# Patient Record
Sex: MALE | URBAN - METROPOLITAN AREA
[De-identification: names, ages, dates, MRNs, and addresses within clinical notes are randomized per-mention and may not be internally consistent; named-entity substitution may affect disease eponyms.]

---

## 2017-10-16 ENCOUNTER — OCC HEALTH (OUTPATIENT)
Dept: OCCUPATIONAL MEDICINE | Age: 47
End: 2017-10-16
Attending: PHYSICIAN ASSISTANT

## 2022-02-28 ENCOUNTER — HOSPITAL ENCOUNTER (OUTPATIENT)
Dept: RADIOLOGY | Facility: HOSPITAL | Age: 52
Discharge: HOME | End: 2022-02-28
Attending: NURSE PRACTITIONER
Payer: OTHER MISCELLANEOUS

## 2022-02-28 ENCOUNTER — OFFICE VISIT (OUTPATIENT)
Dept: PRIMARY CARE CLINIC | Facility: CLINIC | Age: 52
End: 2022-02-28
Payer: OTHER MISCELLANEOUS

## 2022-02-28 ENCOUNTER — LAB VISIT (OUTPATIENT)
Dept: PRIMARY CARE CLINIC | Facility: CLINIC | Age: 52
End: 2022-02-28

## 2022-02-28 VITALS
BODY MASS INDEX: 37.25 KG/M2 | SYSTOLIC BLOOD PRESSURE: 172 MMHG | WEIGHT: 275 LBS | DIASTOLIC BLOOD PRESSURE: 99 MMHG | OXYGEN SATURATION: 98 % | HEART RATE: 65 BPM | TEMPERATURE: 98 F | HEIGHT: 72 IN | RESPIRATION RATE: 20 BRPM

## 2022-02-28 DIAGNOSIS — S90.02XA CONTUSION OF LEFT ANKLE, INITIAL ENCOUNTER: ICD-10-CM

## 2022-02-28 DIAGNOSIS — S90.02XA CONTUSION OF LEFT ANKLE, INITIAL ENCOUNTER: Primary | ICD-10-CM

## 2022-02-28 DIAGNOSIS — Z02.83 ENCOUNTER FOR EMPLOYMENT-RELATED DRUG TESTING: ICD-10-CM

## 2022-02-28 DIAGNOSIS — S82.892A: ICD-10-CM

## 2022-02-28 PROCEDURE — 99000 PR SPECIMEN HANDLING,DR OFF->LAB: ICD-10-PCS | Mod: ,,, | Performed by: NURSE PRACTITIONER

## 2022-02-28 PROCEDURE — 73610 X-RAY EXAM OF ANKLE: CPT | Mod: TC,LT

## 2022-02-28 PROCEDURE — 99000 SPECIMEN HANDLING OFFICE-LAB: CPT | Mod: ,,, | Performed by: NURSE PRACTITIONER

## 2022-02-28 PROCEDURE — 73610 X-RAY EXAM OF ANKLE: CPT | Mod: 26,LT,, | Performed by: RADIOLOGY

## 2022-02-28 PROCEDURE — 99204 OFFICE O/P NEW MOD 45 MIN: CPT | Mod: ,,, | Performed by: NURSE PRACTITIONER

## 2022-02-28 PROCEDURE — 99204 PR OFFICE/OUTPT VISIT, NEW, LEVL IV, 45-59 MIN: ICD-10-PCS | Mod: ,,, | Performed by: NURSE PRACTITIONER

## 2022-02-28 PROCEDURE — 73610 XR ANKLE COMPLETE 3 VIEW LEFT: ICD-10-PCS | Mod: 26,LT,, | Performed by: RADIOLOGY

## 2022-02-28 RX ORDER — MELOXICAM 7.5 MG/1
7.5 TABLET ORAL DAILY
Qty: 10 TABLET | Refills: 0 | Status: SHIPPED | OUTPATIENT
Start: 2022-02-28

## 2022-02-28 NOTE — PROGRESS NOTES
Subjective:       Patient ID: Sharon Paul is a 51 y.o. male.    Chief Complaint: Work Related Injury (Presents today with work related injury that occurred today 02/28/22 to left ankle)    52 y/o bm presents with complaints of left ankle, lateral side injury. 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     Review of Systems   Musculoskeletal: Positive for gait problem, joint swelling and joint deformity.   All other systems reviewed and are negative.        Objective:      Physical Exam  Vitals and nursing note reviewed.   Constitutional:       Appearance: Normal appearance.   HENT:      Head: Normocephalic.   Eyes:      Pupils: Pupils are equal, round, and reactive to light.   Cardiovascular:      Rate and Rhythm: Normal rate and regular rhythm.      Heart sounds: Normal heart sounds.   Pulmonary:      Effort: Pulmonary effort is normal.      Breath sounds: Normal breath sounds.   Musculoskeletal:         General: Swelling, tenderness, deformity and signs of injury present. Normal range of motion.      Cervical back: Normal range of motion.      Comments: Left ankle, lateral side, with swelling and deformity.    Skin:     General: Skin is warm and dry.   Neurological:      General: No focal deficit present.      Mental Status: He is alert and oriented to person, place, and time.   Psychiatric:         Attention and Perception: Attention and perception normal.         Mood and Affect: Mood normal.         Speech: Speech normal.         Behavior: Behavior normal. Behavior is cooperative.         Cognition and Memory: Cognition normal.         Judgment: Judgment normal.       Imaging: X-Ray Ankle Complete 3 View Left    Result Date: 2/28/2022  EXAMINATION: XR ANKLE COMPLETE 3 VIEW LEFT CLINICAL HISTORY: Contusion of left ankle, initial encounter COMPARISON: None  TECHNIQUE: Frontal, lateral, and oblique views of the left ankle.   FINDINGS: Nondisplaced fracture through the inferior aspect of the lateral malleolus.  Overlying soft tissue swelling.     As above. Point of Service: [facility] Electronically signed by: Anup Coleman Date:    02/28/2022 Time:    10:46      Assessment:       Problem List Items Addressed This Visit        Orthopedic    Contusion of left ankle - Primary    Relevant Orders    X-Ray Ankle Complete 3 View Left (Completed)       Other    Fracture of left malleolus, closed, initial encounter          Plan:       Spoke with Dr. Mckeon, geoff placed and pt is to see Dr. Mckeon in am at 0930. Ice, elevate and Mobic as needed for pain. Out of work until seen by Dr. Mckeon.

## 2022-02-28 NOTE — PROGRESS NOTES
Subjective:       Patient ID: Sharon Paul is a 51 y.o. male.    Chief Complaint: No chief complaint on file.    HPI  Review of Systems      Objective:      Physical Exam    Assessment:       Problem List Items Addressed This Visit    None     Visit Diagnoses     Encounter for employment-related drug testing              Plan:         Drug testing only

## 2022-03-08 ENCOUNTER — HOSPITAL ENCOUNTER (OUTPATIENT)
Dept: RADIOLOGY | Facility: HOSPITAL | Age: 52
Discharge: HOME | End: 2022-03-08
Attending: ORTHOPAEDIC SURGERY
Payer: OTHER MISCELLANEOUS

## 2022-03-08 DIAGNOSIS — M79.672 LEFT FOOT PAIN: ICD-10-CM

## 2022-03-08 DIAGNOSIS — Z47.89 ORTHOPEDIC AFTERCARE: ICD-10-CM

## 2022-03-08 DIAGNOSIS — M25.562 ACUTE PAIN OF LEFT KNEE: ICD-10-CM

## 2022-03-08 PROCEDURE — 73562 X-RAY EXAM OF KNEE 3: CPT | Mod: TC,LT

## 2022-03-08 PROCEDURE — 73610 X-RAY EXAM OF ANKLE: CPT | Mod: TC,LT

## 2022-03-08 PROCEDURE — 73630 X-RAY EXAM OF FOOT: CPT | Mod: TC,LT

## 2022-03-29 ENCOUNTER — HOSPITAL ENCOUNTER (OUTPATIENT)
Dept: RADIOLOGY | Facility: HOSPITAL | Age: 52
Discharge: HOME | End: 2022-03-29
Attending: ORTHOPAEDIC SURGERY
Payer: OTHER MISCELLANEOUS

## 2022-03-29 DIAGNOSIS — Z47.89 ORTHOPEDIC AFTERCARE: ICD-10-CM

## 2022-03-29 PROCEDURE — 73610 X-RAY EXAM OF ANKLE: CPT | Mod: TC,LT

## 2022-04-19 ENCOUNTER — HOSPITAL ENCOUNTER (OUTPATIENT)
Dept: RADIOLOGY | Facility: HOSPITAL | Age: 52
Discharge: HOME | End: 2022-04-19
Attending: ORTHOPAEDIC SURGERY
Payer: OTHER MISCELLANEOUS

## 2022-04-19 DIAGNOSIS — Z47.89 ORTHOPEDIC AFTERCARE: ICD-10-CM

## 2022-04-19 PROCEDURE — 73610 X-RAY EXAM OF ANKLE: CPT | Mod: TC,LT

## 2022-05-17 ENCOUNTER — HOSPITAL ENCOUNTER (OUTPATIENT)
Dept: RADIOLOGY | Facility: HOSPITAL | Age: 52
Discharge: HOME | End: 2022-05-17
Attending: ORTHOPAEDIC SURGERY
Payer: OTHER MISCELLANEOUS

## 2022-05-17 DIAGNOSIS — Z09 FOLLOW-UP EXAMINATION, FOLLOWING OTHER SURGERY: ICD-10-CM

## 2022-05-17 DIAGNOSIS — Z47.89 ORTHOPEDIC AFTERCARE: ICD-10-CM

## 2022-05-17 PROCEDURE — 73610 X-RAY EXAM OF ANKLE: CPT | Mod: TC,LT

## 2022-05-17 PROCEDURE — 93971 US LOWER EXTREMITY VEINS LEFT: ICD-10-PCS | Mod: 26,LT,,

## 2022-05-17 PROCEDURE — 93971 EXTREMITY STUDY: CPT | Mod: TC,LT

## 2022-05-17 PROCEDURE — 93971 EXTREMITY STUDY: CPT | Mod: 26,LT,,

## 2022-05-19 DIAGNOSIS — M25.572 LEFT ANKLE PAIN: Primary | ICD-10-CM

## 2022-05-23 ENCOUNTER — CLINICAL SUPPORT (OUTPATIENT)
Dept: REHABILITATION | Facility: HOSPITAL | Age: 52
End: 2022-05-23
Attending: ORTHOPAEDIC SURGERY
Payer: OTHER MISCELLANEOUS

## 2022-05-23 DIAGNOSIS — R29.898 WEAKNESS OF LEFT LEG: ICD-10-CM

## 2022-05-23 DIAGNOSIS — M25.572 PAIN AND SWELLING OF LEFT ANKLE: ICD-10-CM

## 2022-05-23 DIAGNOSIS — S82.832D OTHER CLOSED FRACTURE OF DISTAL END OF LEFT FIBULA WITH ROUTINE HEALING, SUBSEQUENT ENCOUNTER: Primary | ICD-10-CM

## 2022-05-23 DIAGNOSIS — M25.572 LEFT ANKLE PAIN: ICD-10-CM

## 2022-05-23 DIAGNOSIS — M25.672 DECREASED RANGE OF MOTION OF LEFT ANKLE: ICD-10-CM

## 2022-05-23 DIAGNOSIS — M25.472 PAIN AND SWELLING OF LEFT ANKLE: ICD-10-CM

## 2022-05-23 PROCEDURE — 97161 PT EVAL LOW COMPLEX 20 MIN: CPT

## 2022-05-23 PROCEDURE — 97110 THERAPEUTIC EXERCISES: CPT

## 2022-05-23 NOTE — PROGRESS NOTES
See Plan of Care     Sup Visit performed today with DUDLEY Corrales and DUDLEY Amos.  All goals and treatment plan reviewed. Will work toward completion of all goals set.    First Treatment May Include :     Exercises   NuStep  SB    Ankle 4 way - Active Range of Motion   Dorsiflexion   Plantarflexion   Inversion   Eversion   Circles       Achilles Stretch on Stairs - 4 x 15 seconds  Heel Raises on Stairs - 3 x 10     Manual Therapy   Passive Range of Motion   Ankle Mobs     Modalities   E Stim or Ultrasound to address pain and swelling

## 2022-05-31 ENCOUNTER — CLINICAL SUPPORT (OUTPATIENT)
Dept: REHABILITATION | Facility: HOSPITAL | Age: 52
End: 2022-05-31
Payer: OTHER MISCELLANEOUS

## 2022-05-31 DIAGNOSIS — M25.572 PAIN AND SWELLING OF LEFT ANKLE: Primary | ICD-10-CM

## 2022-05-31 DIAGNOSIS — R29.898 WEAKNESS OF LEFT LEG: ICD-10-CM

## 2022-05-31 DIAGNOSIS — M25.472 PAIN AND SWELLING OF LEFT ANKLE: Primary | ICD-10-CM

## 2022-05-31 DIAGNOSIS — M25.672 DECREASED RANGE OF MOTION OF LEFT ANKLE: ICD-10-CM

## 2022-05-31 PROCEDURE — 97140 MANUAL THERAPY 1/> REGIONS: CPT | Mod: CQ

## 2022-05-31 PROCEDURE — 97110 THERAPEUTIC EXERCISES: CPT | Mod: CQ

## 2022-05-31 NOTE — PROGRESS NOTES
[facility]   Physical Therapy Treatment Note     Name: Sharon Paul  Clinic Number: [id number]  Physician: Kurtis Mckeon III, MD  Visit Date: 5/31/2022  Therapy Diagnosis:  Left Distal Fibular Fracture   Physician: Kurtis Mckeon III, MD     Physician Orders: PT Eval and Treat   Medical Diagnosis from Referral: Left Distal Fibular Fracture   Evaluation Date: 5/23/2022  Updated Plan of Care Due : 6/22/2022  Authorization Period Expiration: 5/19/2023  Plan of Care Expiration: 7/22/2022  Visit # / Visits authorized: 2 / 17    PTA Visit #: 1/5     Time In: 09:15 am  Time Out: 09:58 am  Total Billable Time: 43 minutes    SUBJECTIVE     Pt reports: he has been doing his stretches given to him at first visit and his pinky toe is numb and is foot is swollen, he is trying to prop it up and ice it as much as he can.  He was compliant with home exercise program.  Response to previous treatment: first visit since evaluation   Functional change: n/a    Pain: 5/10  Location: left ankle    OBJECTIVE     Objective Measures updated at progress report unless specified.     Treatment     Sharon received the treatments listed below:      therapeutic exercises to develop strength, endurance, ROM and flexibility for 30 minutes including:  Bike 5 minutes   SB stretch 4 x 20 second hold   prostretch in standing LLE 5 x 10 second hold   Achilles Stretch on Stairs - 4 x 15 seconds    Ankle 4 way - Active Range of Motion over wobble board   Dorsiflexion / Plantarflexion 20x   Inversion / Eversion 20x   Circles - CW and CCW 20x ea    Heel Raises on Stairs - 3 x 10       manual therapy techniques: Myofacial release, Manual Lymphatic Drainage and Soft tissue Mobilization were applied to the: left lower extremity  for 10 minutes, including:   Manual Therapy   Passive Range of Motion   Ankle Mobs   Myofascial release with green theraroller      Patient Education and Home Exercises     Home Exercises Provided and Patient  Education Provided     Education provided:   - importance of applying ice at home and elevating lower extremity to further decrease p! And edema.    Written Home Exercises Provided: Patient instructed to cont prior HEP. Exercises were reviewed and Sharon was able to demonstrate them prior to the end of the session.  Sharon demonstrated good  understanding of the education provided. See EMR under Patient Instructions for exercises provided during therapy sessions    ASSESSMENT   Supervisory visit with GELY ROSA, PT, DPT  prior to initial PTA visit.    Pt tolerated initial tx since evaluation well with pain experienced during stretches, but expressed relief as stretches continued. Therapist initiated Plan of Care with focus of improving range of motion and decreasing p!. Pt has significant edema present on left ankle at bilateral malleoli. He is showing improvements with range of motion and is able to actively move his ankle more in all 4 planes. Will continue to progress pt as tolerated within Plan of Care when he returns.       PMH:   Sharon is a 51 y.o. male referred to outpatient Physical Therapy with a medical diagnosis of Left Distal Fibula Fracture. Patient presents with increased pain and swelling in the Left Ankle. Edema is significant in the lower leg and especially around bilateral malleoli. He has pain with movement in all directions. He has significantly decreased Range of Motion and strength in the Left Ankle. Fibula appears to be healing well, but pain and mobility are still a problem. It appears patient may have some soft tissue damage. He may benefit from an MRI in the future if the pain and dysfunction are not able to be reduced. Patient has difficulty with ambulation. Patient ambulates with step-to Gait pattern. He has no dorsiflexion or plantarflexion during ambulation, rather he keeps the ankle in neutral at all times. Patient has decreased stance time and antalgic gait on the Left. Patient will  require Physical Therapy intervention to address all deficits and help patient to return to his max level of function.      Sharon Is progressing towards his goals.   Pt prognosis is Good.     Pt will continue to benefit from skilled outpatient physical therapy to address the deficits listed in the problem list box on initial evaluation, provide pt/family education and to maximize pt's level of independence in the home and community environment.     Pt's spiritual, cultural and educational needs considered and pt agreeable to plan of care and goals.     Anticipated Barriers for therapy: Possible Soft Tissue Damage     Goals:  Short Term Goals: 4 weeks   1. Independent with Home Exercise Program   2. Increase Left Ankle Active Range of Motion to 15 Degrees for Dorsiflexion and 40 Degrees for Plantarflexion  3. Increase Left Ankle Active Range of Motion to 30 Degrees for Inversion and 20 Degrees for Eversion  4. Increase Left Ankle Strength to grossly 3+/5  5. Patient will ambulate 500 feet with complaints of pain Less than or equal to 4/10     Long Term Goals: 8 weeks   1. Patient will increase Left Ankle Strength to grossly 4+/5  2. Patient will increase Left Ankle Active Range of Motion to Within Normal Limits all directions   3. Patient will ambulate 1000+ feet with complaints of pain in Left Ankle at Less than or equal to 3/10    PLAN     Plan of care Certification: 5/23/2022 to 7/22/2022.     Outpatient Physical Therapy 2 times weekly for 8 weeks to include the following interventions: Electrical Stimulation to decrease pain and inflammation, Gait Training, Manual Therapy, Moist Heat/ Ice, Neuromuscular Re-ed, Patient Education, Therapeutic Exercise and Ultrasound.       Bryan Mccormick, PTA

## 2022-06-08 ENCOUNTER — CLINICAL SUPPORT (OUTPATIENT)
Dept: REHABILITATION | Facility: HOSPITAL | Age: 52
End: 2022-06-08
Payer: OTHER MISCELLANEOUS

## 2022-06-08 DIAGNOSIS — M25.472 PAIN AND SWELLING OF LEFT ANKLE: Primary | ICD-10-CM

## 2022-06-08 DIAGNOSIS — M25.672 DECREASED RANGE OF MOTION OF LEFT ANKLE: ICD-10-CM

## 2022-06-08 DIAGNOSIS — R29.898 WEAKNESS OF LEFT LEG: ICD-10-CM

## 2022-06-08 DIAGNOSIS — M25.572 PAIN AND SWELLING OF LEFT ANKLE: Primary | ICD-10-CM

## 2022-06-08 PROCEDURE — 97140 MANUAL THERAPY 1/> REGIONS: CPT | Mod: CQ

## 2022-06-08 PROCEDURE — 97110 THERAPEUTIC EXERCISES: CPT | Mod: CQ

## 2022-06-08 PROCEDURE — 97016 VASOPNEUMATIC DEVICE THERAPY: CPT | Mod: CQ

## 2022-06-08 NOTE — PROGRESS NOTES
[facility]   Physical Therapy Treatment Note     Name: Sharon Paul  Clinic Number: [id number]  Physician: Kurtis Mckeon III, MD  Visit Date: 6/8/2022  Therapy Diagnosis:  Left Distal Fibular Fracture   Physician: Kurtis Mckeon III, MD     Physician Orders: PT Eval and Treat   Medical Diagnosis from Referral: Left Distal Fibular Fracture   Evaluation Date: 5/23/2022  Updated Plan of Care Due : 6/22/2022  Authorization Period Expiration: 5/19/2023  Plan of Care Expiration: 7/22/2022  Visit # / Visits authorized: 3 / 17    PTA Visit #: 2/5     Time In: 09:15 am  Time Out: 10:10 am  Total Billable Time: 55 minutes    SUBJECTIVE     Pt reports: he has been doing his stretches that were added to his home exercise program last visit and he reports they worked and he was a little sore but his swelling has gone down a good bit to where he was able to put his gym shoes on today. he is trying to prop it up and ice it as much as he can.  He was compliant with home exercise program.  Response to previous treatment: sore but relieved with decreased swelling  Functional change: n/a    Pain: 3/10  Location: left ankle    OBJECTIVE     Objective Measures updated at progress report unless specified.     Treatment     Sharon received the treatments listed below:      therapeutic exercises to develop strength, endurance, ROM and flexibility for 30 minutes including:  Bike 5 minutes   SB stretch 4 x 20 second hold   prostretch in standing LLE 5 x 15 second hold   Achilles Stretch on Stairs - 4 x 15 seconds  Heel Raises on Stairs - 2 x 10   Step ups LLE 2in step 20x     Ankle 4 way - Active Range of Motion over blue wobble board   Dorsiflexion / Plantarflexion 20x   Inversion / Eversion 20x   Circles - CW and CCW 20x ea    Resisted 4 way ankle yellow tband 20x ea LLE     manual therapy techniques: Myofacial release, Manual Lymphatic Drainage and Soft tissue Mobilization were applied to the: left lower extremity   for 10 minutes, including:   Manual Therapy   Passive Range of Motion   Ankle Mobs   Myofascial release with green theraroller    supervised modalities after being cleared for contradictions: Vasopneumatic GameReady with compression and LLE elevated over bolster for 10 minutes and  36 degrees    Patient Education and Home Exercises     Home Exercises Provided and Patient Education Provided     Education provided:   - importance of applying ice at home and elevating lower extremity to further decrease p! And edema.    Written Home Exercises Provided: Patient instructed to cont prior HEP. Exercises were reviewed and Sharon was able to demonstrate them prior to the end of the session.  Sharon demonstrated good  understanding of the education provided. See EMR under Patient Instructions for exercises provided during therapy sessions    ASSESSMENT     Pt arrived able to wear both lace up tennis shoes today, prior to last visit he was unable to wear these due to the significant edema present in LLE. He expressed the ankle exercises that were added to his home exercise program last time helped a lot and he has been doing them since last visit at home. Therapist progressed pt to resisted 4 way ankle with yellow band today and pt demo good carryover with this and only fatigue present at end. ROM was also assessed and updated today in the chart below. He is progressing well and we will continue to address edema in LLE when he returns and then progress resistive exercises at his tolerance while keeping edema and p! Under control. Ended tx with Vasopneumatic compression today with LLE elevated over bolster in supine. Pt expressed this helped relieve his pain.    Left Lower Extremity at evaluation (5/23/2022) ROM  (normal) Left lower extremity 6/8/2022       AROM PROM   AROM PROM                        5   ANKLE  Dorsiflexion  (20)        5 10   10     Plantarflexion  (50) 25 30   5      Inversion      (35)     18 22   5       Eversion        (97) 10 18                                                   PMH:   Sharon is a 51 y.o. male referred to outpatient Physical Therapy with a medical diagnosis of Left Distal Fibula Fracture. Patient presents with increased pain and swelling in the Left Ankle. Edema is significant in the lower leg and especially around bilateral malleoli. He has pain with movement in all directions. He has significantly decreased Range of Motion and strength in the Left Ankle. Fibula appears to be healing well, but pain and mobility are still a problem. It appears patient may have some soft tissue damage. He may benefit from an MRI in the future if the pain and dysfunction are not able to be reduced. Patient has difficulty with ambulation. Patient ambulates with step-to Gait pattern. He has no dorsiflexion or plantarflexion during ambulation, rather he keeps the ankle in neutral at all times. Patient has decreased stance time and antalgic gait on the Left. Patient will require Physical Therapy intervention to address all deficits and help patient to return to his max level of function.      Sharon Is progressing towards his goals.   Pt prognosis is Good.     Pt will continue to benefit from skilled outpatient physical therapy to address the deficits listed in the problem list box on initial evaluation, provide pt/family education and to maximize pt's level of independence in the home and community environment.     Pt's spiritual, cultural and educational needs considered and pt agreeable to plan of care and goals.     Anticipated Barriers for therapy: Possible Soft Tissue Damage     Goals:  Short Term Goals: 4 weeks   1. Independent with Home Exercise Program   2. Increase Left Ankle Active Range of Motion to 15 Degrees for Dorsiflexion and 40 Degrees for Plantarflexion  3. Increase Left Ankle Active Range of Motion to 30 Degrees for Inversion and 20 Degrees for Eversion  4. Increase Left Ankle Strength to grossly 3+/5  5.  Patient will ambulate 500 feet with complaints of pain Less than or equal to 4/10     Long Term Goals: 8 weeks   1. Patient will increase Left Ankle Strength to grossly 4+/5  2. Patient will increase Left Ankle Active Range of Motion to Within Normal Limits all directions   3. Patient will ambulate 1000+ feet with complaints of pain in Left Ankle at Less than or equal to 3/10    PLAN     Plan of care Certification: 5/23/2022 to 7/22/2022.     Outpatient Physical Therapy 2 times weekly for 8 weeks to include the following interventions: Electrical Stimulation to decrease pain and inflammation, Gait Training, Manual Therapy, Moist Heat/ Ice, Neuromuscular Re-ed, Patient Education, Therapeutic Exercise and Ultrasound.       Bryan Mccormick, PTA      6/8/2022

## 2022-06-13 NOTE — PLAN OF CARE
[facility]   Physical Therapy Initial Evaluation    Date: 5/23/2022   Name: Sharon Paul   Clinic Number: [id number]    Therapy Diagnosis:  Left Distal Fibular Fracture   Physician: Kurtis Mckeon III, MD    Physician Orders: PT Eval and Treat   Medical Diagnosis from Referral: Left Distal Fibular Fracture   Evaluation Date: 5/23/2022  Updated Plan of Care Due : 6/22/2022  Authorization Period Expiration: 5/19/2023  Plan of Care Expiration: 7/22/2022  Visit # / Visits authorized: 1/ 1 (Awaiting Worker's Comp Approval)    Time In: 5:00 pm   Time Out: 5:55 pm   Total Appointment Time (timed & untimed codes): 55 minutes    Precautions: Standard    Subjective   Date of onset: 2/28/22  History of current condition - Sharon reports: He was at work on 2/28/2022 and 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 Patient was found to have a nondisplaced fracture of the tip of the fibula good alignment.    Patient saw Ortho MD for a follow up on 5/17/2022. MD Note States in part :      Follow-up       LT ANKLE FX 3/18 (8 WEEKS)         Previos History :     Previous Info:   HISTORY:   Sharon Paul  is a 51 y.o. doing a lot better after got a roll about a lot better mood his knee is calmed down it is not bother ankle is feeling better now he has got his weight off of it still has the numbness on the lateral side of his foot however        IMPRESSION: Follow-up examination, following other surgery      Distal fibular fracture treated in a cam walker boot and a roll about seated job at work still has some numbness distally on the lateral side of the foot but discussed with him the of this by gets a stretch injury but the skin was not cuts there is no laceration to repair  Ass/Plan:  1. Ultrasound rule out DVT today   2.  will send to therapy for weight-bearing as tolerated work on range  "motion of the ankle once we rule out DVT  Ultrasound was negative for DVTs working put him on a coated aspirin 1 a day and get him in therapy check on him in 4 weeks        Medical History:   No past medical history on file.    Surgical History:   Sharon Paul  has a past surgical history that includes Appendectomy.    Medications:   Sharon has a current medication list which includes the following prescription(s): meloxicam.    Allergies:   Review of patient's allergies indicates:  No Known Allergies     Imaging : X-rays three views left ankle show the ankle mortise reduced there is a nondisplaced fracture of the tip of the fibula good alignment.      Prior Therapy: None for this   Social History:  lives with their spouse  Occupation: [employer]    Prior Level of Function: No problems with the leg prior to the accident  Current Level of Function: Swelling and pain in the Left Ankle    Pain:  Current 6/10, worst 10/10, best 4/10   Location: Left Ankle and Left Knee on occasion   Description: Aching, Throbbing, Numb and Sharp  Aggravating Factors: weight bearing activities   Easing Factors: relaxation, pain medication, ice, rest and elevation    Patients goals: "I want my life back. I need my leg to get better."    Objective       ANKLE OBSERVATION  Pronation: Bilateral     Girth malleoli: 33.5 cm left, 28 cm right         Right Lower Extremity  ROM/Strength Left lower Extremity     AROM PROM STRENGTH  AROM PROM STRENGTH   Normal    5/5 KNEE     Flexion  (140)  Normal   5/5                     Extension (0)                         20   5/5 ANKLE  Dorsiflexion     (20)        5 10  2/5   50                  Plantarflexion  (50) 10 15  2/5   35                  Inversion         (35)     5 10  1/5   30                   Eversion          (25) 5 8  1/5                                              ANKLE SPECIAL TESTS  Ankle was too painful for any special tests today so these were deferred     C. " Ankle  1. Anterior drawer test: Deferred   2. Inversion stress test: Deferred   3. Eversion stress test: Deferred     Weight Bearing:  [x] WEIGHT BEARING AS TOLERATED  [] PARTIAL WEIGHT BEARING  %  [] TOUCH TOE WEIGHT BEARING  [] NONWEIGHT BEARING     Ambulation:  Patient ambulates with step-to Gait pattern. He has no dorsiflexion or plantarflexion during ambulation, rather he keeps the ankle in neutral at all times. Patient has decreased stance time and antalgic gait on the Left.       KNEE OBSERVATION  Knee was Within Normal Limits today, but he does have some Left knee pain with increased weight bearing      Limitation/Restriction for FOTO Intake Ankle Survey    Therapist reviewed FOTO scores for Sharon Paul on 5/23/2022.   FOTO documents entered into [employer] - see Media section.    Limitation Score: 92%         TREATMENT   Treatment Time In: 5:40 pm   Treatment Time Out: 5:50 pm   Total Treatment time (time-based codes) separate from Evaluation: 10 minutes    Sharon received the treatments listed below:  THERAPEUTIC EXERCISES to develop ROM and flexibility for 10 minutes including :   Initiated the Home strengthening and stretching Program       Home Exercises and Patient Education Provided    Education provided:   - Discussed the findings from the Evaluation, Reviewed the Plan of Care, and Instructed patient on their Home Exercise Program     Written Home Exercises Provided: yes.  Exercises were reviewed and Sharon was able to demonstrate them prior to the end of the session.  Sharon demonstrated good  understanding of the education provided.     See EMR under Patient Instructions for exercises provided 5/23/2022.    Assessment   Sharon is a 51 y.o. male referred to outpatient Physical Therapy with a medical diagnosis of Left Distal Fibula Fracture. Patient presents with increased pain and swelling in the Left Ankle. Edema is significant in the lower leg and especially around bilateral malleoli. He has pain with movement  in all directions. He has significantly decreased Range of Motion and strength in the Left Ankle. Fibula appears to be healing well, but pain and mobility are still a problem. It appears patient may have some soft tissue damage. He may benefit from an MRI in the future if the pain and dysfunction are not able to be reduced. Patient has difficulty with ambulation. Patient ambulates with step-to Gait pattern. He has no dorsiflexion or plantarflexion during ambulation, rather he keeps the ankle in neutral at all times. Patient has decreased stance time and antalgic gait on the Left. Patient will require Physical Therapy intervention to address all deficits and help patient to return to his max level of function.       Patient prognosis is Good.   Patientt will benefit from skilled outpatient Physical Therapy to address the deficits stated above and in the chart below, provide patient /family education, and to maximize patientt's level of independence.     Plan of care discussed with patient: Yes  Patient's spiritual, cultural and educational needs considered and patient is agreeable to the plan of care and goals as stated below:     Anticipated Barriers for therapy: Possible Soft Tissue Damage    Goals:  Short Term Goals: 4 weeks   1. Independent with Home Exercise Program   2. Increase Left Ankle Active Range of Motion to 15 Degrees for Dorsiflexion and 40 Degrees for Plantarflexion  3. Increase Left Ankle Active Range of Motion to 30 Degrees for Inversion and 20 Degrees for Eversion  4. Increase Left Ankle Strength to grossly 3+/5  5. Patient will ambulate 500 feet with complaints of pain Less than or equal to 4/10    Long Term Goals: 8 weeks   1. Patient will increase Left Ankle Strength to grossly 4+/5  2. Patient will increase Left Ankle Active Range of Motion to Within Normal Limits all directions   3. Patient will ambulate 1000+ feet with complaints of pain in Left Ankle at Less than or equal to 3/10      Plan    Plan of care Certification: 5/23/2022 to 7/22/2022.    Outpatient Physical Therapy 2 times weekly for 8 weeks to include the following interventions: Electrical Stimulation to decrease pain and inflammation, Gait Training, Manual Therapy, Moist Heat/ Ice, Neuromuscular Re-ed, Patient Education, Therapeutic Exercise and Ultrasound.     GELY ROSA, PT, DPT    called by dr watters at triage

## 2022-06-16 ENCOUNTER — HOSPITAL ENCOUNTER (OUTPATIENT)
Dept: RADIOLOGY | Facility: HOSPITAL | Age: 52
Discharge: HOME | End: 2022-06-16
Attending: ORTHOPAEDIC SURGERY
Payer: OTHER MISCELLANEOUS

## 2022-06-16 DIAGNOSIS — Z47.89 ORTHOPEDIC AFTERCARE: ICD-10-CM

## 2022-06-16 PROCEDURE — 73610 X-RAY EXAM OF ANKLE: CPT | Mod: TC,LT

## 2022-07-05 ENCOUNTER — DOCUMENTATION ONLY (OUTPATIENT)
Dept: REHABILITATION | Facility: HOSPITAL | Age: 52
End: 2022-07-05
Payer: OTHER MISCELLANEOUS

## 2022-07-05 PROBLEM — R29.898 WEAKNESS OF LEFT LEG: Status: RESOLVED | Noted: 2022-05-23 | Resolved: 2022-07-05

## 2022-07-05 PROBLEM — M25.672 DECREASED RANGE OF MOTION OF LEFT ANKLE: Status: RESOLVED | Noted: 2022-05-23 | Resolved: 2022-07-05

## 2022-07-05 PROBLEM — M25.472 PAIN AND SWELLING OF LEFT ANKLE: Status: RESOLVED | Noted: 2022-05-23 | Resolved: 2022-07-05

## 2022-07-05 PROBLEM — S82.832D CLOSED FRACTURE OF DISTAL END OF LEFT FIBULA WITH ROUTINE HEALING: Status: RESOLVED | Noted: 2022-05-23 | Resolved: 2022-07-05

## 2022-07-05 PROBLEM — M25.572 PAIN AND SWELLING OF LEFT ANKLE: Status: RESOLVED | Noted: 2022-05-23 | Resolved: 2022-07-05

## 2024-06-25 ENCOUNTER — EKG ENCOUNTER (OUTPATIENT)
Dept: LAB | Age: 54
End: 2024-06-25
Attending: FAMILY MEDICINE

## 2024-06-25 ENCOUNTER — LAB ENCOUNTER (OUTPATIENT)
Dept: LAB | Facility: REFERENCE LAB | Age: 54
End: 2024-06-25
Attending: FAMILY MEDICINE

## 2024-06-25 DIAGNOSIS — Z01.810 PRE-OPERATIVE CARDIOVASCULAR EXAMINATION: Primary | ICD-10-CM

## 2024-06-25 DIAGNOSIS — E11.9 TYPE II DIABETES MELLITUS (HCC): ICD-10-CM

## 2024-06-25 LAB
ALBUMIN SERPL-MCNC: 4.5 G/DL (ref 3.2–4.8)
ALBUMIN/GLOB SERPL: 1.3 {RATIO} (ref 1–2)
ALP LIVER SERPL-CCNC: 63 U/L
ALT SERPL-CCNC: 32 U/L
ANION GAP SERPL CALC-SCNC: 3 MMOL/L (ref 0–18)
AST SERPL-CCNC: 24 U/L (ref ?–34)
BASOPHILS # BLD AUTO: 0.03 X10(3) UL (ref 0–0.2)
BASOPHILS NFR BLD AUTO: 0.8 %
BILIRUB SERPL-MCNC: 0.7 MG/DL (ref 0.3–1.2)
BUN BLD-MCNC: 12 MG/DL (ref 9–23)
BUN/CREAT SERPL: 12.2 (ref 10–20)
CALCIUM BLD-MCNC: 9.9 MG/DL (ref 8.7–10.4)
CHLORIDE SERPL-SCNC: 108 MMOL/L (ref 98–112)
CO2 SERPL-SCNC: 28 MMOL/L (ref 21–32)
CREAT BLD-MCNC: 0.98 MG/DL
DEPRECATED RDW RBC AUTO: 40.7 FL (ref 35.1–46.3)
EGFRCR SERPLBLD CKD-EPI 2021: 92 ML/MIN/1.73M2 (ref 60–?)
EOSINOPHIL # BLD AUTO: 0.14 X10(3) UL (ref 0–0.7)
EOSINOPHIL NFR BLD AUTO: 3.6 %
ERYTHROCYTE [DISTWIDTH] IN BLOOD BY AUTOMATED COUNT: 16.2 % (ref 11–15)
EST. AVERAGE GLUCOSE BLD GHB EST-MCNC: 151 MG/DL (ref 68–126)
FASTING STATUS PATIENT QL REPORTED: YES
GLOBULIN PLAS-MCNC: 3.6 G/DL (ref 2–3.5)
GLUCOSE BLD-MCNC: 92 MG/DL (ref 70–99)
HBA1C MFR BLD: 6.9 % (ref ?–5.7)
HCT VFR BLD AUTO: 41 %
HGB BLD-MCNC: 12.7 G/DL
IMM GRANULOCYTES # BLD AUTO: 0.01 X10(3) UL (ref 0–1)
IMM GRANULOCYTES NFR BLD: 0.3 %
LYMPHOCYTES # BLD AUTO: 2.05 X10(3) UL (ref 1–4)
LYMPHOCYTES NFR BLD AUTO: 53.4 %
MCH RBC QN AUTO: 22.1 PG (ref 26–34)
MCHC RBC AUTO-ENTMCNC: 31 G/DL (ref 31–37)
MCV RBC AUTO: 71.3 FL
MONOCYTES # BLD AUTO: 0.52 X10(3) UL (ref 0.1–1)
MONOCYTES NFR BLD AUTO: 13.5 %
NEUTROPHILS # BLD AUTO: 1.09 X10 (3) UL (ref 1.5–7.7)
NEUTROPHILS # BLD AUTO: 1.09 X10(3) UL (ref 1.5–7.7)
NEUTROPHILS NFR BLD AUTO: 28.4 %
OSMOLALITY SERPL CALC.SUM OF ELEC: 287 MOSM/KG (ref 275–295)
PLATELET # BLD AUTO: 221 10(3)UL (ref 150–450)
POTASSIUM SERPL-SCNC: 4 MMOL/L (ref 3.5–5.1)
PROT SERPL-MCNC: 8.1 G/DL (ref 5.7–8.2)
RBC # BLD AUTO: 5.75 X10(6)UL
SODIUM SERPL-SCNC: 139 MMOL/L (ref 136–145)
WBC # BLD AUTO: 3.8 X10(3) UL (ref 4–11)

## 2024-06-25 PROCEDURE — 85025 COMPLETE CBC W/AUTO DIFF WBC: CPT

## 2024-06-25 PROCEDURE — 80053 COMPREHEN METABOLIC PANEL: CPT

## 2024-06-25 PROCEDURE — 93010 ELECTROCARDIOGRAM REPORT: CPT | Performed by: INTERNAL MEDICINE

## 2024-06-25 PROCEDURE — 93005 ELECTROCARDIOGRAM TRACING: CPT

## 2024-06-25 PROCEDURE — 83036 HEMOGLOBIN GLYCOSYLATED A1C: CPT

## 2024-06-25 PROCEDURE — 36415 COLL VENOUS BLD VENIPUNCTURE: CPT

## 2024-06-26 LAB
ATRIAL RATE: 67 BPM
P AXIS: 50 DEGREES
P-R INTERVAL: 158 MS
Q-T INTERVAL: 400 MS
QRS DURATION: 82 MS
QTC CALCULATION (BEZET): 422 MS
R AXIS: 12 DEGREES
T AXIS: 35 DEGREES
VENTRICULAR RATE: 67 BPM